# Patient Record
Sex: MALE | Race: WHITE
[De-identification: names, ages, dates, MRNs, and addresses within clinical notes are randomized per-mention and may not be internally consistent; named-entity substitution may affect disease eponyms.]

---

## 2018-01-22 ENCOUNTER — HOSPITAL ENCOUNTER (OUTPATIENT)
Dept: HOSPITAL 62 - SP | Age: 53
End: 2018-01-22
Attending: INTERNAL MEDICINE
Payer: COMMERCIAL

## 2018-01-22 DIAGNOSIS — E11.9: ICD-10-CM

## 2018-01-22 DIAGNOSIS — Z82.49: ICD-10-CM

## 2018-01-22 DIAGNOSIS — E78.00: ICD-10-CM

## 2018-01-22 DIAGNOSIS — R07.2: Primary | ICD-10-CM

## 2018-01-22 PROCEDURE — 93017 CV STRESS TEST TRACING ONLY: CPT

## 2018-01-22 NOTE — DRAGON STRESS TEST REPORT
Exercise EKG treadmill stress test.



Data procedure: 1/22/2018.  Ordering Provider: Dr. Alberto Arshad.  Patient Status: 
Out Patient



Indication: Chest Pain.  Coronary risk factors: Age, diabetes mellitus, family 
history of coronary artery disease, and hyper cholesterolemia..



Significant physical findings prior to stress testing show a blood pressure of 
149/94 and a heart rate of 65 beats per minute.

Auscultation of the heart shows normal S1 and S2.NoS3 or S4 gallops.  Systolic 
murmur in the left sternal border and apex.  Lungs are clear to auscultation 
and percussion.



Resting 12-lead EKG: Sinus Rhythm.  Within Normal Limits.



Procedure: The patient was excised on a  standard Saeed protocol. .  The 
patient walked a total of 9 minutes and   03 seconds on this protocol and 
reached a peak heart rate of 148 beats per minute, which is a 88% of maximum 
predicted heart rate for age.  This is at a workload of 10.10 METS .  The test 
was stopped because of target heart rate of 83% of maximum predicted heart rate 
exceeded.  The patient described no symptoms of chest pain/discomfort.  



Exercise EKG's show:.  There is no EKG evidence of exercise-induced myocardial 
ischemia by EKG criteria.



Arrhythmias seen:None.



The blood pressure response was normal.  At peak exercise the blood pressure 
was 196/94 millimeters of Hg. The double product was 29.0 k.



Summary of findings and interpretation:



1.  No chest pain or chest discomfort symptoms reproduced.

2.  No EKG evidence of ischemia in the form of ST segment depression.

3.  Normal blood pressure response.

4.  No arrhythmias seen.

5.  Good exercise tolerance, good aerobic capacity.  Diagnostic treadmill 
stress test negative for ischemia by EKG criteria.



Recommendations:

Aggressive risk factor modification, and treatment of underlying co-morbidities.



Note that the patient gives a history of nighttime rapid palpitations of his 
heart.  Hence he might need a workup of this with an echocardiogram and event 
monitor or a 24-hour Holter monitor.  This is been discussed with Dr. Arshad.

RAHEEM